# Patient Record
Sex: MALE | Race: ASIAN | NOT HISPANIC OR LATINO | Employment: FULL TIME | URBAN - METROPOLITAN AREA
[De-identification: names, ages, dates, MRNs, and addresses within clinical notes are randomized per-mention and may not be internally consistent; named-entity substitution may affect disease eponyms.]

---

## 2024-06-27 ENCOUNTER — HOSPITAL ENCOUNTER (OUTPATIENT)
Dept: LAB | Facility: MEDICAL CENTER | Age: 49
End: 2024-06-27
Payer: COMMERCIAL

## 2024-06-27 ENCOUNTER — APPOINTMENT (OUTPATIENT)
Dept: MEDICAL GROUP | Facility: PHYSICIAN GROUP | Age: 49
End: 2024-06-27

## 2024-06-27 VITALS
OXYGEN SATURATION: 99 % | DIASTOLIC BLOOD PRESSURE: 80 MMHG | SYSTOLIC BLOOD PRESSURE: 134 MMHG | TEMPERATURE: 97.1 F | HEART RATE: 80 BPM | WEIGHT: 178.25 LBS | BODY MASS INDEX: 27.01 KG/M2 | HEIGHT: 68 IN | RESPIRATION RATE: 20 BRPM

## 2024-06-27 DIAGNOSIS — Z13.0 SCREENING FOR ENDOCRINE, METABOLIC AND IMMUNITY DISORDER: ICD-10-CM

## 2024-06-27 DIAGNOSIS — Z11.59 NEED FOR HEPATITIS C SCREENING TEST: ICD-10-CM

## 2024-06-27 DIAGNOSIS — E55.9 VITAMIN D DEFICIENCY: ICD-10-CM

## 2024-06-27 DIAGNOSIS — Z13.228 SCREENING FOR ENDOCRINE, METABOLIC AND IMMUNITY DISORDER: ICD-10-CM

## 2024-06-27 DIAGNOSIS — Z11.3 SCREENING EXAMINATION FOR SEXUALLY TRANSMITTED DISEASE: ICD-10-CM

## 2024-06-27 DIAGNOSIS — Z13.6 SCREENING FOR CARDIOVASCULAR CONDITION: ICD-10-CM

## 2024-06-27 DIAGNOSIS — Z12.11 COLON CANCER SCREENING: ICD-10-CM

## 2024-06-27 DIAGNOSIS — Z13.29 SCREENING FOR ENDOCRINE, METABOLIC AND IMMUNITY DISORDER: ICD-10-CM

## 2024-06-27 DIAGNOSIS — R73.03 PREDIABETES: ICD-10-CM

## 2024-06-27 LAB
25(OH)D3 SERPL-MCNC: 19 NG/ML (ref 30–100)
ALBUMIN SERPL BCP-MCNC: 4.5 G/DL (ref 3.2–4.9)
ALBUMIN/GLOB SERPL: 1.6 G/DL
ALP SERPL-CCNC: 78 U/L (ref 30–99)
ALT SERPL-CCNC: 44 U/L (ref 2–50)
ANION GAP SERPL CALC-SCNC: 12 MMOL/L (ref 7–16)
AST SERPL-CCNC: 21 U/L (ref 12–45)
BASOPHILS # BLD AUTO: 0.8 % (ref 0–1.8)
BASOPHILS # BLD: 0.05 K/UL (ref 0–0.12)
BILIRUB SERPL-MCNC: 0.9 MG/DL (ref 0.1–1.5)
BUN SERPL-MCNC: 15 MG/DL (ref 8–22)
CALCIUM ALBUM COR SERPL-MCNC: 9.1 MG/DL (ref 8.5–10.5)
CALCIUM SERPL-MCNC: 9.5 MG/DL (ref 8.5–10.5)
CHLORIDE SERPL-SCNC: 100 MMOL/L (ref 96–112)
CHOLEST SERPL-MCNC: 245 MG/DL (ref 100–199)
CO2 SERPL-SCNC: 25 MMOL/L (ref 20–33)
CREAT SERPL-MCNC: 0.86 MG/DL (ref 0.5–1.4)
EOSINOPHIL # BLD AUTO: 0.35 K/UL (ref 0–0.51)
EOSINOPHIL NFR BLD: 5.7 % (ref 0–6.9)
ERYTHROCYTE [DISTWIDTH] IN BLOOD BY AUTOMATED COUNT: 36.1 FL (ref 35.9–50)
EST. AVERAGE GLUCOSE BLD GHB EST-MCNC: 212 MG/DL
FASTING STATUS PATIENT QL REPORTED: NORMAL
GFR SERPLBLD CREATININE-BSD FMLA CKD-EPI: 107 ML/MIN/1.73 M 2
GLOBULIN SER CALC-MCNC: 2.8 G/DL (ref 1.9–3.5)
GLUCOSE SERPL-MCNC: 278 MG/DL (ref 65–99)
HBA1C MFR BLD: 9 % (ref 4–5.6)
HCT VFR BLD AUTO: 47.3 % (ref 42–52)
HCV AB SER QL: NORMAL
HDLC SERPL-MCNC: 52 MG/DL
HGB BLD-MCNC: 17.3 G/DL (ref 14–18)
HIV 1+2 AB+HIV1 P24 AG SERPL QL IA: NORMAL
IMM GRANULOCYTES # BLD AUTO: 0.04 K/UL (ref 0–0.11)
IMM GRANULOCYTES NFR BLD AUTO: 0.7 % (ref 0–0.9)
LDLC SERPL CALC-MCNC: 158 MG/DL
LYMPHOCYTES # BLD AUTO: 1.71 K/UL (ref 1–4.8)
LYMPHOCYTES NFR BLD: 28 % (ref 22–41)
MCH RBC QN AUTO: 29.7 PG (ref 27–33)
MCHC RBC AUTO-ENTMCNC: 36.6 G/DL (ref 32.3–36.5)
MCV RBC AUTO: 81.1 FL (ref 81.4–97.8)
MONOCYTES # BLD AUTO: 0.52 K/UL (ref 0–0.85)
MONOCYTES NFR BLD AUTO: 8.5 % (ref 0–13.4)
NEUTROPHILS # BLD AUTO: 3.44 K/UL (ref 1.82–7.42)
NEUTROPHILS NFR BLD: 56.3 % (ref 44–72)
NRBC # BLD AUTO: 0 K/UL
NRBC BLD-RTO: 0 /100 WBC (ref 0–0.2)
PLATELET # BLD AUTO: 196 K/UL (ref 164–446)
PMV BLD AUTO: 8.8 FL (ref 9–12.9)
POTASSIUM SERPL-SCNC: 3.7 MMOL/L (ref 3.6–5.5)
PROT SERPL-MCNC: 7.3 G/DL (ref 6–8.2)
RBC # BLD AUTO: 5.83 M/UL (ref 4.7–6.1)
SODIUM SERPL-SCNC: 137 MMOL/L (ref 135–145)
TRIGL SERPL-MCNC: 177 MG/DL (ref 0–149)
TSH SERPL DL<=0.005 MIU/L-ACNC: 2.13 UIU/ML (ref 0.38–5.33)
WBC # BLD AUTO: 6.1 K/UL (ref 4.8–10.8)

## 2024-06-27 PROCEDURE — 99204 OFFICE O/P NEW MOD 45 MIN: CPT

## 2024-06-27 PROCEDURE — 80053 COMPREHEN METABOLIC PANEL: CPT

## 2024-06-27 PROCEDURE — 84443 ASSAY THYROID STIM HORMONE: CPT

## 2024-06-27 PROCEDURE — 3075F SYST BP GE 130 - 139MM HG: CPT

## 2024-06-27 PROCEDURE — 36415 COLL VENOUS BLD VENIPUNCTURE: CPT

## 2024-06-27 PROCEDURE — 86803 HEPATITIS C AB TEST: CPT

## 2024-06-27 PROCEDURE — 87389 HIV-1 AG W/HIV-1&-2 AB AG IA: CPT

## 2024-06-27 PROCEDURE — 3079F DIAST BP 80-89 MM HG: CPT

## 2024-06-27 PROCEDURE — 83036 HEMOGLOBIN GLYCOSYLATED A1C: CPT

## 2024-06-27 PROCEDURE — 82306 VITAMIN D 25 HYDROXY: CPT

## 2024-06-27 PROCEDURE — 85025 COMPLETE CBC W/AUTO DIFF WBC: CPT

## 2024-06-27 PROCEDURE — 80061 LIPID PANEL: CPT

## 2024-06-27 SDOH — HEALTH STABILITY: MENTAL HEALTH
STRESS IS WHEN SOMEONE FEELS TENSE, NERVOUS, ANXIOUS, OR CAN'T SLEEP AT NIGHT BECAUSE THEIR MIND IS TROUBLED. HOW STRESSED ARE YOU?: ONLY A LITTLE

## 2024-06-27 SDOH — HEALTH STABILITY: PHYSICAL HEALTH: ON AVERAGE, HOW MANY DAYS PER WEEK DO YOU ENGAGE IN MODERATE TO STRENUOUS EXERCISE (LIKE A BRISK WALK)?: 4 DAYS

## 2024-06-27 SDOH — ECONOMIC STABILITY: INCOME INSECURITY: HOW HARD IS IT FOR YOU TO PAY FOR THE VERY BASICS LIKE FOOD, HOUSING, MEDICAL CARE, AND HEATING?: SOMEWHAT HARD

## 2024-06-27 SDOH — ECONOMIC STABILITY: TRANSPORTATION INSECURITY
IN THE PAST 12 MONTHS, HAS THE LACK OF TRANSPORTATION KEPT YOU FROM MEDICAL APPOINTMENTS OR FROM GETTING MEDICATIONS?: NO

## 2024-06-27 SDOH — ECONOMIC STABILITY: HOUSING INSECURITY
IN THE LAST 12 MONTHS, WAS THERE A TIME WHEN YOU DID NOT HAVE A STEADY PLACE TO SLEEP OR SLEPT IN A SHELTER (INCLUDING NOW)?: NO

## 2024-06-27 SDOH — ECONOMIC STABILITY: INCOME INSECURITY: IN THE LAST 12 MONTHS, WAS THERE A TIME WHEN YOU WERE NOT ABLE TO PAY THE MORTGAGE OR RENT ON TIME?: NO

## 2024-06-27 SDOH — ECONOMIC STABILITY: FOOD INSECURITY: WITHIN THE PAST 12 MONTHS, YOU WORRIED THAT YOUR FOOD WOULD RUN OUT BEFORE YOU GOT MONEY TO BUY MORE.: SOMETIMES TRUE

## 2024-06-27 SDOH — ECONOMIC STABILITY: FOOD INSECURITY: WITHIN THE PAST 12 MONTHS, THE FOOD YOU BOUGHT JUST DIDN'T LAST AND YOU DIDN'T HAVE MONEY TO GET MORE.: NEVER TRUE

## 2024-06-27 SDOH — HEALTH STABILITY: PHYSICAL HEALTH: ON AVERAGE, HOW MANY MINUTES DO YOU ENGAGE IN EXERCISE AT THIS LEVEL?: 60 MIN

## 2024-06-27 SDOH — ECONOMIC STABILITY: TRANSPORTATION INSECURITY
IN THE PAST 12 MONTHS, HAS LACK OF RELIABLE TRANSPORTATION KEPT YOU FROM MEDICAL APPOINTMENTS, MEETINGS, WORK OR FROM GETTING THINGS NEEDED FOR DAILY LIVING?: NO

## 2024-06-27 SDOH — ECONOMIC STABILITY: HOUSING INSECURITY: IN THE LAST 12 MONTHS, HOW MANY PLACES HAVE YOU LIVED?: 2

## 2024-06-27 SDOH — ECONOMIC STABILITY: TRANSPORTATION INSECURITY
IN THE PAST 12 MONTHS, HAS LACK OF TRANSPORTATION KEPT YOU FROM MEETINGS, WORK, OR FROM GETTING THINGS NEEDED FOR DAILY LIVING?: NO

## 2024-06-27 ASSESSMENT — PATIENT HEALTH QUESTIONNAIRE - PHQ9: CLINICAL INTERPRETATION OF PHQ2 SCORE: 0

## 2024-06-27 ASSESSMENT — ENCOUNTER SYMPTOMS
WHEEZING: 0
NAUSEA: 0
PALPITATIONS: 0
CONSTIPATION: 0
HEADACHES: 0
DIZZINESS: 0
CHILLS: 0
ABDOMINAL PAIN: 0
WEIGHT LOSS: 0
SHORTNESS OF BREATH: 0
FEVER: 0
TINGLING: 0
BLOOD IN STOOL: 0
COUGH: 0
DIARRHEA: 0
VOMITING: 0

## 2024-06-27 ASSESSMENT — SOCIAL DETERMINANTS OF HEALTH (SDOH)
DO YOU BELONG TO ANY CLUBS OR ORGANIZATIONS SUCH AS CHURCH GROUPS UNIONS, FRATERNAL OR ATHLETIC GROUPS, OR SCHOOL GROUPS?: YES
HOW OFTEN DO YOU HAVE A DRINK CONTAINING ALCOHOL: MONTHLY OR LESS
HOW OFTEN DO YOU GET TOGETHER WITH FRIENDS OR RELATIVES?: ONCE A WEEK
IN A TYPICAL WEEK, HOW MANY TIMES DO YOU TALK ON THE PHONE WITH FAMILY, FRIENDS, OR NEIGHBORS?: TWICE A WEEK
DO YOU BELONG TO ANY CLUBS OR ORGANIZATIONS SUCH AS CHURCH GROUPS UNIONS, FRATERNAL OR ATHLETIC GROUPS, OR SCHOOL GROUPS?: YES
WITHIN THE PAST 12 MONTHS, YOU WORRIED THAT YOUR FOOD WOULD RUN OUT BEFORE YOU GOT THE MONEY TO BUY MORE: SOMETIMES TRUE
HOW OFTEN DO YOU HAVE SIX OR MORE DRINKS ON ONE OCCASION: LESS THAN MONTHLY
HOW OFTEN DO YOU ATTEND CHURCH OR RELIGIOUS SERVICES?: MORE THAN 4 TIMES PER YEAR
HOW OFTEN DO YOU ATTENT MEETINGS OF THE CLUB OR ORGANIZATION YOU BELONG TO?: MORE THAN 4 TIMES PER YEAR
HOW MANY DRINKS CONTAINING ALCOHOL DO YOU HAVE ON A TYPICAL DAY WHEN YOU ARE DRINKING: 1 OR 2
IN A TYPICAL WEEK, HOW MANY TIMES DO YOU TALK ON THE PHONE WITH FAMILY, FRIENDS, OR NEIGHBORS?: TWICE A WEEK
HOW HARD IS IT FOR YOU TO PAY FOR THE VERY BASICS LIKE FOOD, HOUSING, MEDICAL CARE, AND HEATING?: SOMEWHAT HARD
HOW OFTEN DO YOU GET TOGETHER WITH FRIENDS OR RELATIVES?: ONCE A WEEK
IN THE PAST 12 MONTHS, HAS THE ELECTRIC, GAS, OIL, OR WATER COMPANY THREATENED TO SHUT OFF SERVICE IN YOUR HOME?: NO
HOW OFTEN DO YOU ATTENT MEETINGS OF THE CLUB OR ORGANIZATION YOU BELONG TO?: MORE THAN 4 TIMES PER YEAR
HOW OFTEN DO YOU ATTEND CHURCH OR RELIGIOUS SERVICES?: MORE THAN 4 TIMES PER YEAR

## 2024-06-27 ASSESSMENT — LIFESTYLE VARIABLES
HOW MANY STANDARD DRINKS CONTAINING ALCOHOL DO YOU HAVE ON A TYPICAL DAY: 1 OR 2
HOW OFTEN DO YOU HAVE SIX OR MORE DRINKS ON ONE OCCASION: LESS THAN MONTHLY
AUDIT-C TOTAL SCORE: 2
HOW OFTEN DO YOU HAVE A DRINK CONTAINING ALCOHOL: MONTHLY OR LESS
SKIP TO QUESTIONS 9-10: 0

## 2024-06-27 NOTE — PROGRESS NOTES
"Subjective:     Chief Complaint   Patient presents with    Establish Care     Aurelio Virk is a 49 y.o. male, who is here today to establish care and discuss overall health and possible prediabetes. His prior PCP was in [place].    Problem   Bmi 27.0-27.9,Adult    Patient does not participate in any routine/exercise program and or diet.  Patient states that he does walk his dog daily for about 30 minutes to an hour each day.  Has been avoiding rice but does eat quite a bit of bread  Currently working night shift at [employer]     Prediabetes    Patient feels like they are diagnosed with diabetes and/or prediabetic.  Unable to recall exactly who his previous primary care provider was in [place].  It has been quite sometime since he had blood work done.     Allergies: Patient has no known allergies.    No current outpatient medications on file.     Review of Systems   Constitutional:  Negative for chills, fever and weight loss.   Respiratory:  Negative for cough, shortness of breath and wheezing.    Cardiovascular:  Negative for chest pain, palpitations and leg swelling.   Gastrointestinal:  Negative for abdominal pain, blood in stool, constipation, diarrhea, nausea and vomiting.   Genitourinary:  Negative for hematuria.   Skin:  Negative for itching and rash.   Neurological:  Negative for dizziness, tingling and headaches.     Health Maintenance: Completed    Objective:     /80 (BP Location: Left arm, Patient Position: Sitting, BP Cuff Size: Adult)   Pulse 80   Temp 36.2 °C (97.1 °F) (Temporal)   Resp 20   Ht 1.727 m (5' 8\")   Wt 80.9 kg (178 lb 4 oz)   SpO2 99%  Body mass index is 27.1 kg/m².    Physical Exam  Vitals reviewed.   Constitutional:       General: He is not in acute distress.     Appearance: Normal appearance. He is not ill-appearing.   Cardiovascular:      Rate and Rhythm: Normal rate and regular rhythm.      Heart sounds: Normal heart sounds.   Pulmonary:      Effort: Pulmonary effort is normal. "      Breath sounds: Normal breath sounds.   Abdominal:      General: Abdomen is flat.      Palpations: Abdomen is soft.   Musculoskeletal:         General: Normal range of motion.      Cervical back: Normal range of motion.   Skin:     General: Skin is warm and dry.   Neurological:      General: No focal deficit present.      Mental Status: He is alert.   Psychiatric:         Mood and Affect: Mood normal.         Behavior: Behavior normal.         Thought Content: Thought content normal.         Judgment: Judgment normal.        Assessment & Plan:     The following plan was discussed through shared decision making with the patient:    1. Prediabetes  Chronic, controlled  Will follow-up with blood work discussed decreasing carbohydrates and added sugars in his diet.  Patient will follow-up in a little over a month to discuss blood work and any other concerns  - HEMOGLOBIN A1C; Future    2. BMI 27.0-27.9,adult  Recommendations for healthy lifestyle include:  - Good rule of thumb for portions = size of the palm of your hand  - Eliminate or decrease the amount of all white carbohydrates such as white rice, white bread, white pasta, potatoes, etc.    - Focus on whole foods that are less processed foods and more of plant/animal proteins; great examples are lean poultry or fish, vegetables, nuts, beans, and berries.   - Aim for at least 15-20 grams of fiber per day.    - Avoid drinking sugary beverages such as juice, soda, specialty coffee; these have very little nutritional value and are high in calories.   - Exercise: 150 minutes of moderate aerobic activity per week OR 75 minutes of vigorous aerobic activity per week + 2 days per week of strength    3. Screening for endocrine, metabolic and immunity disorder  Patient establishing care today in office; due for updated lab work/screenings.  Will follow-up in [facility] with lab results.  Discussed with patient that if there is a significant amount of lab abnormalities we  will have a follow-up appointment and I will indicate this is in my follow-up message with lab results.  Patient stated verbal understanding.  - CBC WITH DIFFERENTIAL; Future  - Comp Metabolic Panel; Future  - TSH WITH REFLEX TO FT4; Future    4. Screening for cardiovascular condition  See #3  - Lipid Profile; Future    5. Vitamin D deficiency  See #3  - VITAMIN D,25 HYDROXY (DEFICIENCY); Future    6. Colon cancer screening  See #3  - COLOGUARD (FIT DNA)    7. Screening examination for sexually transmitted disease  See #3  - HIV AG/AB COMBO ASSAY SCREENING; Future    8. Need for hepatitis C screening test  See #3  - HEP C VIRUS ANTIBODY; Future      Return in about 4 weeks (around 7/25/2024) for Labs, Med Check.         Please note that this dictation was created using voice recognition software. I have made every reasonable attempt to correct obvious errors, but I expect that there are errors of grammar and possibly content that I did not discover before finalizing the note.    AMINA Starks  [facility]  [facility]

## 2024-08-01 ENCOUNTER — OFFICE VISIT (OUTPATIENT)
Dept: MEDICAL GROUP | Facility: PHYSICIAN GROUP | Age: 49
End: 2024-08-01
Payer: COMMERCIAL

## 2024-08-01 VITALS
DIASTOLIC BLOOD PRESSURE: 78 MMHG | OXYGEN SATURATION: 99 % | BODY MASS INDEX: 27.03 KG/M2 | HEART RATE: 82 BPM | SYSTOLIC BLOOD PRESSURE: 124 MMHG | TEMPERATURE: 97.8 F | HEIGHT: 68 IN | WEIGHT: 178.38 LBS | RESPIRATION RATE: 20 BRPM

## 2024-08-01 DIAGNOSIS — E11.9 TYPE 2 DIABETES MELLITUS WITHOUT COMPLICATION, WITHOUT LONG-TERM CURRENT USE OF INSULIN (HCC): ICD-10-CM

## 2024-08-01 DIAGNOSIS — R79.89 ABNORMAL CBC: ICD-10-CM

## 2024-08-01 DIAGNOSIS — E78.5 DYSLIPIDEMIA: ICD-10-CM

## 2024-08-01 DIAGNOSIS — E55.9 VITAMIN D DEFICIENCY: ICD-10-CM

## 2024-08-01 PROCEDURE — 3078F DIAST BP <80 MM HG: CPT

## 2024-08-01 PROCEDURE — 3074F SYST BP LT 130 MM HG: CPT

## 2024-08-01 PROCEDURE — 99214 OFFICE O/P EST MOD 30 MIN: CPT

## 2024-08-01 RX ORDER — ERGOCALCIFEROL 1.25 MG/1
50000 CAPSULE ORAL
Qty: 12 CAPSULE | Refills: 3 | Status: SHIPPED | OUTPATIENT
Start: 2024-08-01

## 2024-08-01 ASSESSMENT — FIBROSIS 4 INDEX: FIB4 SCORE: 0.76

## 2024-08-01 NOTE — PROGRESS NOTES
"Subjective:     Chief Complaint   Patient presents with    Lab Results     HPI: Aurelio presents today with  Problem   Vitamin D Deficiency      Currently not taking any vitamin D supplementation.    Patient does work night shift.     Dyslipidemia    Currently not taking any cholesterol-lowering medication.  Patient works night shift currently.  Patient states that he does not feel like his diet is very unhealthy but could probably decrease his amount of rice intake and increase physical activity.  Patient does walk his dog daily around the [place] here in [facility]  Lab Results   Component Value Date/Time    CHOLSTRLTOT 245 (H) 06/27/2024 1127    TRIGLYCERIDE 177 (H) 06/27/2024 1127    HDL 52 06/27/2024 1127     (H) 06/27/2024 1127   The 10-year ASCVD risk score (Vahe ROJAS, et al., 2019) is: 6%     Type 2 Diabetes Mellitus Without Complication, Without Long-Term Current Use of Insulin (Hcc)    Diagnosed in office today.  Patient knew that he was either prediabetic or on the verge of being diabetic.  A1C: 9.0% (6/2024)    Patient does not feel like he eats a lot of sugary sweets but does eat brown rice and carbohydrates.  Patient does not partake in any extracurricular and/or routine physical activity except for walking his dog around the [facility] daily.  Denies any polydipsia, polyuria, polyphasia     Allergies: Patient has no known allergies.  ROS:per HPI  Health Maintenance: Completed   Objective:     /78 (BP Location: Left arm, Patient Position: Sitting, BP Cuff Size: Adult)   Pulse 82   Temp 36.6 °C (97.8 °F) (Temporal)   Resp 20   Ht 1.727 m (5' 8\")   Wt 80.9 kg (178 lb 6 oz)   SpO2 99%   BMI 27.12 kg/m²  Body mass index is 27.12 kg/m².     Physical Exam  Vitals reviewed.   Constitutional:       General: He is not in acute distress.     Appearance: Normal appearance. He is not ill-appearing.   Cardiovascular:      Rate and Rhythm: Normal rate and regular rhythm.   Pulmonary:      " Effort: Pulmonary effort is normal. No respiratory distress.   Skin:     Coloration: Skin is not jaundiced or pale.   Neurological:      General: No focal deficit present.      Mental Status: He is alert and oriented to person, place, and time.   Psychiatric:         Mood and Affect: Mood normal.         Behavior: Behavior normal.         Thought Content: Thought content normal.         Judgment: Judgment normal.        Results for orders placed or performed during the hospital encounter of 06/27/24   HIV AG/AB COMBO ASSAY SCREENING   Result Value Ref Range    HIV Ag/Ab Combo Assay Non-Reactive Non Reactive   HEP C VIRUS ANTIBODY   Result Value Ref Range    Hepatitis C Antibody Non-Reactive Non-Reactive   TSH WITH REFLEX TO FT4   Result Value Ref Range    TSH 2.130 0.380 - 5.330 uIU/mL   VITAMIN D,25 HYDROXY (DEFICIENCY)   Result Value Ref Range    25-Hydroxy   Vitamin D 25 19 (L) 30 - 100 ng/mL   Lipid Profile   Result Value Ref Range    Cholesterol,Tot 245 (H) 100 - 199 mg/dL    Triglycerides 177 (H) 0 - 149 mg/dL    HDL 52 >=40 mg/dL     (H) <100 mg/dL   HEMOGLOBIN A1C   Result Value Ref Range    Glycohemoglobin 9.0 (H) 4.0 - 5.6 %    Est Avg Glucose 212 mg/dL   Comp Metabolic Panel   Result Value Ref Range    Sodium 137 135 - 145 mmol/L    Potassium 3.7 3.6 - 5.5 mmol/L    Chloride 100 96 - 112 mmol/L    Co2 25 20 - 33 mmol/L    Anion Gap 12.0 7.0 - 16.0    Glucose 278 (H) 65 - 99 mg/dL    Bun 15 8 - 22 mg/dL    Creatinine 0.86 0.50 - 1.40 mg/dL    Calcium 9.5 8.5 - 10.5 mg/dL    Correct Calcium 9.1 8.5 - 10.5 mg/dL    AST(SGOT) 21 12 - 45 U/L    ALT(SGPT) 44 2 - 50 U/L    Alkaline Phosphatase 78 30 - 99 U/L    Total Bilirubin 0.9 0.1 - 1.5 mg/dL    Albumin 4.5 3.2 - 4.9 g/dL    Total Protein 7.3 6.0 - 8.2 g/dL    Globulin 2.8 1.9 - 3.5 g/dL    A-G Ratio 1.6 g/dL   CBC WITH DIFFERENTIAL   Result Value Ref Range    WBC 6.1 4.8 - 10.8 K/uL    RBC 5.83 4.70 - 6.10 M/uL    Hemoglobin 17.3 14.0 - 18.0 g/dL     Hematocrit 47.3 42.0 - 52.0 %    MCV 81.1 (L) 81.4 - 97.8 fL    MCH 29.7 27.0 - 33.0 pg    MCHC 36.6 (H) 32.3 - 36.5 g/dL    RDW 36.1 35.9 - 50.0 fL    Platelet Count 196 164 - 446 K/uL    MPV 8.8 (L) 9.0 - 12.9 fL    Neutrophils-Polys 56.30 44.00 - 72.00 %    Lymphocytes 28.00 22.00 - 41.00 %    Monocytes 8.50 0.00 - 13.40 %    Eosinophils 5.70 0.00 - 6.90 %    Basophils 0.80 0.00 - 1.80 %    Immature Granulocytes 0.70 0.00 - 0.90 %    Nucleated RBC 0.00 0.00 - 0.20 /100 WBC    Neutrophils (Absolute) 3.44 1.82 - 7.42 K/uL    Lymphs (Absolute) 1.71 1.00 - 4.80 K/uL    Monos (Absolute) 0.52 0.00 - 0.85 K/uL    Eos (Absolute) 0.35 0.00 - 0.51 K/uL    Baso (Absolute) 0.05 0.00 - 0.12 K/uL    Immature Granulocytes (abs) 0.04 0.00 - 0.11 K/uL    NRBC (Absolute) 0.00 K/uL   FASTING STATUS   Result Value Ref Range    Fasting Status Fasting    ESTIMATED GFR   Result Value Ref Range    GFR (CKD-EPI) 107 >60 mL/min/1.73 m 2      Assessment and Plan:     The following treatment plan was discussed through shared decision making with the patient:    1. Type 2 diabetes mellitus without complication, without long-term current use of insulin (HCC)  Chronic, uncontrolled.  Reviewed patient's dietary intake with focus on reduction or elimination of simple carbohydrates, encouraged patient to increase fiber intake to at least 25 grams per day as tolerated.   Aerobic and anaerobic exercise is recommended, aim for 10 minutes daily five days per week and increase to 30 minutes daily as tolerated.   Patient will start metformin 500 mg daily for the next 2 weeks and as long as he does not develop any symptoms with this medication he will increase to twice daily for the remainder of the 3 months until we follow-up in office.  Current medication will be adjusted with repeat labs in 3 months to re-evaluate glycohemoglobin.   Patient advised to present to ER for severe fatigue, confusion, weakness, rapid heard rate.   - metFORMIN (GLUCOPHAGE)  500 MG Tab; Take 1 Tablet by mouth every day for 14 days, THEN 1 Tablet 2 times a day with meals for 76 days.  Dispense: 166 Tablet; Refill: 0  - Comp Metabolic Panel; Future    2. Dyslipidemia  Chronic, uncontrolled.   The patient is not on a statin for primary prevention. The last cholesterol panel in 6/2024 was outside normal limits.  At this time his ASCVD risk score is still under the recommendation to start statin therapy.  At this time patient will work on diet lifestyle modifications and we will reevaluate this in 6 months.  - Lipid Profile; Future    3. Vitamin D deficiency  Chronic, uncontrolled.  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  Patient will follow-up with updated blood work in 6 months.  - ergocalciferol (DRISDOL) 86089 UNIT capsule; Take 1 Capsule by mouth every 7 days.  Dispense: 12 Capsule; Refill: 3  - VITAMIN D,25 HYDROXY (DEFICIENCY); Future    4. Abnormal CBC  Slight discrepancies in his blood count, we will follow-up with repeat blood work in 6 months  - CBC WITH DIFFERENTIAL; Future      Return in about 3 months (around 11/1/2024) for DM w/ A1C.         Please note that this note was created using dictation with voice recognition software. I have made every reasonable attempt to correct obvious errors, but I expect that there are errors of grammar and possibly content that I did not discover before finalizing the note.    AMINA Starks  [facility]  [facility]

## 2024-09-30 DIAGNOSIS — E11.9 TYPE 2 DIABETES MELLITUS WITHOUT COMPLICATION, WITHOUT LONG-TERM CURRENT USE OF INSULIN (HCC): ICD-10-CM

## 2024-10-14 ENCOUNTER — TELEPHONE (OUTPATIENT)
Dept: HEALTH INFORMATION MANAGEMENT | Facility: OTHER | Age: 49
End: 2024-10-14
Payer: COMMERCIAL

## 2024-11-07 ENCOUNTER — HOSPITAL ENCOUNTER (OUTPATIENT)
Facility: MEDICAL CENTER | Age: 49
End: 2024-11-07
Payer: COMMERCIAL

## 2024-11-07 ENCOUNTER — OFFICE VISIT (OUTPATIENT)
Dept: MEDICAL GROUP | Facility: PHYSICIAN GROUP | Age: 49
End: 2024-11-07
Payer: COMMERCIAL

## 2024-11-07 VITALS
RESPIRATION RATE: 20 BRPM | TEMPERATURE: 97.8 F | OXYGEN SATURATION: 98 % | SYSTOLIC BLOOD PRESSURE: 138 MMHG | HEART RATE: 80 BPM | DIASTOLIC BLOOD PRESSURE: 72 MMHG | BODY MASS INDEX: 27.43 KG/M2 | HEIGHT: 68 IN | WEIGHT: 181 LBS

## 2024-11-07 DIAGNOSIS — E55.9 VITAMIN D DEFICIENCY: ICD-10-CM

## 2024-11-07 DIAGNOSIS — E11.9 TYPE 2 DIABETES MELLITUS WITHOUT COMPLICATION, WITHOUT LONG-TERM CURRENT USE OF INSULIN (HCC): ICD-10-CM

## 2024-11-07 DIAGNOSIS — E78.5 DYSLIPIDEMIA: ICD-10-CM

## 2024-11-07 LAB
CREAT UR-MCNC: 144.21 MG/DL
HBA1C MFR BLD: 9.9 % (ref ?–5.8)
MICROALBUMIN UR-MCNC: 2.9 MG/DL
MICROALBUMIN/CREAT UR: 20 MG/G (ref 0–30)
POCT INT CON NEG: NEGATIVE
POCT INT CON POS: POSITIVE

## 2024-11-07 PROCEDURE — 83036 HEMOGLOBIN GLYCOSYLATED A1C: CPT

## 2024-11-07 PROCEDURE — 82043 UR ALBUMIN QUANTITATIVE: CPT

## 2024-11-07 PROCEDURE — 3078F DIAST BP <80 MM HG: CPT

## 2024-11-07 PROCEDURE — 3075F SYST BP GE 130 - 139MM HG: CPT

## 2024-11-07 PROCEDURE — 99214 OFFICE O/P EST MOD 30 MIN: CPT

## 2024-11-07 PROCEDURE — 82570 ASSAY OF URINE CREATININE: CPT

## 2024-11-07 ASSESSMENT — FIBROSIS 4 INDEX: FIB4 SCORE: 0.76

## 2024-11-07 NOTE — PROGRESS NOTES
"Subjective:     Chief Complaint   Patient presents with    Diabetes Follow-up     History of Present Illness  The patient is a 49-year-old male here to follow up on diabetes with A1c in office and yearly diabetic screenings.    He continues his regimen of metformin 500 mg twice daily. He reports no increase in urinary frequency, attributing any need to urinate to his consumption of caffeinated or energy drinks, particularly in his hot work environment. He experiences no gastrointestinal issues when taking metformin with food, but occasionally experiences discomfort when taken without food. His morning routine includes a small breakfast, typically a piece of bread, to accompany his medication. He admits to a high rice intake, consuming it twice daily. He experienced weakness during the initial weeks of metformin treatment, but this has since resolved.    He has run out of his vitamin D supplement.    He previously had hypertension, which he managed with over-the-counter magnesium for a year. He monitors his blood pressure with a [employer] watch, noting improvement since starting magnesium. He experienced diarrhea when he first started taking magnesium, but this resolved after two weeks. He was previously prescribed blood pressure medication in [place], but found it ineffective and discontinued its use.    FAMILY HISTORY  His father had hypertension.    Allergies: Patient has no known allergies.  ROS per HPI  Health Maintenance: Completed diabetic Screenings today   Objective:     /72 (BP Location: Left arm, Patient Position: Sitting, BP Cuff Size: Adult)   Pulse 80   Temp 36.6 °C (97.8 °F) (Temporal)   Resp 20   Ht 1.727 m (5' 8\")   Wt 82.1 kg (181 lb)   SpO2 98%   BMI 27.52 kg/m²  Body mass index is 27.52 kg/m².     Physical Exam  Vitals reviewed.   Constitutional:       General: He is not in acute distress.     Appearance: Normal appearance. He is not ill-appearing.   Cardiovascular:      Rate and Rhythm: " Normal rate and regular rhythm.   Pulmonary:      Effort: Pulmonary effort is normal. No respiratory distress.   Feet:      Comments: Diabetic foot exam: No lesions or calluses noted. 2+ pedal pulses. Sensation intact with 10 out of 10 on monofilament test.  Skin:     Coloration: Skin is not jaundiced or pale.   Neurological:      General: No focal deficit present.      Mental Status: He is alert and oriented to person, place, and time.   Psychiatric:         Mood and Affect: Mood normal.         Behavior: Behavior normal.         Thought Content: Thought content normal.         Judgment: Judgment normal.        Results  Laboratory Studies  A1c has increased.    Results for orders placed or performed in visit on 11/07/24   POCT  A1C    Collection Time: 11/07/24 11:02 AM   Result Value Ref Range    Glycohemoglobin 9.9 (A) <=5.8 %    Internal Control Positive Positive     Internal Control Negative Negative       Assessment and Plan:     The following treatment plan was discussed through shared decision making with the patient:    1. Type 2 diabetes mellitus without complication, without long-term current use of insulin ([facility])  POCT  A1C    MICROALBUMIN CREAT RATIO URINE    Diabetic Monofilament LE Exam    metformin (GLUCOPHAGE) 1000 MG tablet    HEMOGLOBIN A1C    DISCONTINUED: metformin (GLUCOPHAGE) 1000 MG tablet      2. Vitamin D deficiency        3. Dyslipidemia          Assessment & Plan  1. Diabetes Mellitus.  His A1c levels have increased, indicating a need for dietary modifications and medication adjustment. He was advised to incorporate protein-rich foods and to pair carbohydrates with fats or proteins to maintain steady blood sugar levels. He was also advised to limit his rice intake to once daily and to substitute one meal's rice with a salad or vegetables. His metformin dosage will be increased from 500 mg to 1000 mg twice daily, with a new prescription for a 3-month supply. He was instructed to complete his  blood work, including an A1c test, before his next visit.  Will request his retinal scan from his optometrist that he completed approximate 4 months ago.    2. Vitamin D Deficiency.  He was instructed to continue his vitamin D supplementation and to get a refill from [facility] as he has a supply for a whole year.    3. Hyperlipidemia.  His cholesterol levels are elevated and will be rechecked in the new year. If they remain high despite dietary changes, medication may be necessary due to his increased risk from diabetes.    Return in about 3 months (around 2/7/2025) for Labs, Med Check, Diabetes.       Please note that this note was created using dictation with voice recognition software. I have made every reasonable attempt to correct obvious errors, but I expect that there are errors of grammar and possibly content that I did not discover before finalizing the note.    AMINA Starks  [facility]  [facility]

## 2025-03-21 DIAGNOSIS — E11.9 TYPE 2 DIABETES MELLITUS WITHOUT COMPLICATION, WITHOUT LONG-TERM CURRENT USE OF INSULIN (HCC): ICD-10-CM

## 2025-03-26 ENCOUNTER — TELEPHONE (OUTPATIENT)
Dept: HEALTH INFORMATION MANAGEMENT | Facility: OTHER | Age: 50
End: 2025-03-26
Payer: COMMERCIAL

## 2025-04-14 ENCOUNTER — APPOINTMENT (OUTPATIENT)
Dept: MEDICAL GROUP | Facility: PHYSICIAN GROUP | Age: 50
End: 2025-04-14
Payer: COMMERCIAL

## 2025-04-14 ENCOUNTER — HOSPITAL ENCOUNTER (OUTPATIENT)
Dept: LAB | Facility: MEDICAL CENTER | Age: 50
End: 2025-04-14
Payer: COMMERCIAL

## 2025-04-14 VITALS
OXYGEN SATURATION: 96 % | SYSTOLIC BLOOD PRESSURE: 134 MMHG | DIASTOLIC BLOOD PRESSURE: 80 MMHG | WEIGHT: 181 LBS | HEIGHT: 68 IN | RESPIRATION RATE: 18 BRPM | HEART RATE: 86 BPM | BODY MASS INDEX: 27.43 KG/M2 | TEMPERATURE: 97.2 F

## 2025-04-14 DIAGNOSIS — F32.0 CURRENT MILD EPISODE OF MAJOR DEPRESSIVE DISORDER WITHOUT PRIOR EPISODE (HCC): ICD-10-CM

## 2025-04-14 DIAGNOSIS — E11.9 TYPE 2 DIABETES MELLITUS WITHOUT COMPLICATION, WITHOUT LONG-TERM CURRENT USE OF INSULIN (HCC): ICD-10-CM

## 2025-04-14 DIAGNOSIS — E55.9 VITAMIN D DEFICIENCY: ICD-10-CM

## 2025-04-14 DIAGNOSIS — R79.89 ABNORMAL CBC: ICD-10-CM

## 2025-04-14 DIAGNOSIS — F41.0 PANIC ATTACK: ICD-10-CM

## 2025-04-14 DIAGNOSIS — F41.9 ANXIETY: ICD-10-CM

## 2025-04-14 DIAGNOSIS — E78.5 DYSLIPIDEMIA: ICD-10-CM

## 2025-04-14 PROBLEM — F40.240 CLAUSTROPHOBIA: Status: ACTIVE | Noted: 2025-04-14

## 2025-04-14 PROBLEM — F32.9 MAJOR DEPRESSIVE DISORDER: Status: ACTIVE | Noted: 2025-04-14

## 2025-04-14 LAB
BASOPHILS # BLD AUTO: 0.7 % (ref 0–1.8)
BASOPHILS # BLD: 0.05 K/UL (ref 0–0.12)
EOSINOPHIL # BLD AUTO: 0.38 K/UL (ref 0–0.51)
EOSINOPHIL NFR BLD: 5.1 % (ref 0–6.9)
ERYTHROCYTE [DISTWIDTH] IN BLOOD BY AUTOMATED COUNT: 34.9 FL (ref 35.9–50)
EST. AVERAGE GLUCOSE BLD GHB EST-MCNC: 194 MG/DL
HBA1C MFR BLD: 8.4 % (ref 4–5.6)
HCT VFR BLD AUTO: 47.5 % (ref 42–52)
HGB BLD-MCNC: 17.5 G/DL (ref 14–18)
IMM GRANULOCYTES # BLD AUTO: 0.04 K/UL (ref 0–0.11)
IMM GRANULOCYTES NFR BLD AUTO: 0.5 % (ref 0–0.9)
LYMPHOCYTES # BLD AUTO: 1.24 K/UL (ref 1–4.8)
LYMPHOCYTES NFR BLD: 16.7 % (ref 22–41)
MCH RBC QN AUTO: 29.6 PG (ref 27–33)
MCHC RBC AUTO-ENTMCNC: 36.8 G/DL (ref 32.3–36.5)
MCV RBC AUTO: 80.2 FL (ref 81.4–97.8)
MONOCYTES # BLD AUTO: 0.48 K/UL (ref 0–0.85)
MONOCYTES NFR BLD AUTO: 6.5 % (ref 0–13.4)
NEUTROPHILS # BLD AUTO: 5.22 K/UL (ref 1.82–7.42)
NEUTROPHILS NFR BLD: 70.5 % (ref 44–72)
NRBC # BLD AUTO: 0 K/UL
NRBC BLD-RTO: 0 /100 WBC (ref 0–0.2)
PLATELET # BLD AUTO: 211 K/UL (ref 164–446)
PMV BLD AUTO: 8.7 FL (ref 9–12.9)
RBC # BLD AUTO: 5.92 M/UL (ref 4.7–6.1)
WBC # BLD AUTO: 7.4 K/UL (ref 4.8–10.8)

## 2025-04-14 PROCEDURE — 85025 COMPLETE CBC W/AUTO DIFF WBC: CPT

## 2025-04-14 PROCEDURE — 3075F SYST BP GE 130 - 139MM HG: CPT

## 2025-04-14 PROCEDURE — 36415 COLL VENOUS BLD VENIPUNCTURE: CPT

## 2025-04-14 PROCEDURE — 82306 VITAMIN D 25 HYDROXY: CPT

## 2025-04-14 PROCEDURE — 99214 OFFICE O/P EST MOD 30 MIN: CPT

## 2025-04-14 PROCEDURE — 80053 COMPREHEN METABOLIC PANEL: CPT

## 2025-04-14 PROCEDURE — 80061 LIPID PANEL: CPT

## 2025-04-14 PROCEDURE — 3079F DIAST BP 80-89 MM HG: CPT

## 2025-04-14 PROCEDURE — 83036 HEMOGLOBIN GLYCOSYLATED A1C: CPT

## 2025-04-14 ASSESSMENT — PATIENT HEALTH QUESTIONNAIRE - PHQ9
SUM OF ALL RESPONSES TO PHQ QUESTIONS 1-9: 12
CLINICAL INTERPRETATION OF PHQ2 SCORE: 5
5. POOR APPETITE OR OVEREATING: 1 - SEVERAL DAYS

## 2025-04-14 ASSESSMENT — ANXIETY QUESTIONNAIRES
IF YOU CHECKED OFF ANY PROBLEMS ON THIS QUESTIONNAIRE, HOW DIFFICULT HAVE THESE PROBLEMS MADE IT FOR YOU TO DO YOUR WORK, TAKE CARE OF THINGS AT HOME, OR GET ALONG WITH OTHER PEOPLE: SOMEWHAT DIFFICULT
3. WORRYING TOO MUCH ABOUT DIFFERENT THINGS: NOT AT ALL
5. BEING SO RESTLESS THAT IT IS HARD TO SIT STILL: SEVERAL DAYS
1. FEELING NERVOUS, ANXIOUS, OR ON EDGE: SEVERAL DAYS
6. BECOMING EASILY ANNOYED OR IRRITABLE: NOT AT ALL
4. TROUBLE RELAXING: MORE THAN HALF THE DAYS
7. FEELING AFRAID AS IF SOMETHING AWFUL MIGHT HAPPEN: MORE THAN HALF THE DAYS
GAD7 TOTAL SCORE: 7
2. NOT BEING ABLE TO STOP OR CONTROL WORRYING: SEVERAL DAYS

## 2025-04-14 ASSESSMENT — FIBROSIS 4 INDEX: FIB4 SCORE: 0.76

## 2025-04-14 NOTE — PROGRESS NOTES
Subjective:     Chief Complaint   Patient presents with    Anxiety    Medication Follow-up     Metformin      History of Present Illness  The patient presents for evaluation of diabetes and anxiety.    He is scheduled to undergo laboratory tests today. He reports experiencing gastrointestinal discomfort, including loose stools, which he attributes to his metformin medication. This has led to a reluctance to take the medication on workdays due to frequent bathroom visits. He has been inconsistent with his twice-daily dosage, often only taking it after work or when at home. He has attempted to manage his symptoms by reducing his intake of sugary foods and drinks, even eliminating sugar from his coffee. He recalls that he was able to tolerate the lower dose of metformin 500 mg twice daily without significant issues, except for some stomach problems during the first week of treatment. However, he finds the higher dose challenging to manage. He has been inconsistent with his twice-daily dosage, often only taking it after work or when at home. He recalls that he was able to tolerate the lower dose of metformin 500 mg twice daily without significant issues, except for some stomach problems during the first week of treatment.    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 He recalls an incident where he experienced a sudden onset of claustrophobia while in a COVID-19 patient's room, a sensation he describes as feeling suffocated. This episode lasted approximately 5 to 10 minutes and has since recurred whenever he finds himself in enclosed spaces without a visible exit. He also reports experiencing panic attacks triggered by various stimuli, such as seeing a glass sliding door or a dark room, reminiscent of his time in the ICU. 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 He believes his symptoms are a result of a combination of factors, including the pandemic, his divorce, and past trauma. He reports that his symptoms have improved since these stressors have been resolved, but they occasionally resurface. He experiences triggers both at work and outside of work, and sometimes feels as though he is reliving past stressors or issues. He has been able to manage these episodes through breathing exercises and self-reassurance. He recently experienced an episode at work while under a machine, which he was able to manage by reminding himself that he was safe. He is seeking documentation for his anxiety and depression for future reference.    MEDICATIONS  Current: Metformin    Allergies: Patient has no known allergies.  ROS per HPI  Health Maintenance: Completed   Objective:     /80 (BP Location: Left arm, Patient Position: Sitting, BP Cuff Size: Adult)   Pulse 86   Temp 36.2 °C (97.2 °F) (Temporal)   Resp 18   Ht 1.727 m (5' 8\")   Wt 82.1 kg (181 lb)   SpO2 96%   BMI 27.52 kg/m²  Body mass index is 27.52 kg/m².     Physical Exam  Vitals reviewed.   Constitutional:       General: He is not in acute distress.     Appearance: Normal appearance. He is not ill-appearing.   Cardiovascular:      Rate and Rhythm: Normal rate and regular rhythm.   Pulmonary:      Effort: Pulmonary effort is normal. No respiratory distress.   Skin:     Coloration: Skin is not jaundiced or pale.   Neurological:      General: No focal deficit present.      Mental Status: He is alert and oriented to person, place, and time.   Psychiatric:         Mood and Affect: Mood normal.         Behavior: Behavior normal.         Thought Content: Thought content normal.         Judgment: Judgment normal.        Results  Laboratory Studies  A1c was 9.9 at last visit.    Results for orders placed or performed " during the hospital encounter of 11/07/24   MICROALBUMIN CREAT RATIO URINE    Collection Time: 11/07/24 11:08 AM   Result Value Ref Range    Creatinine, Urine 144.21 mg/dL    Microalbumin, Urine Random 2.9 mg/dL    Micro Alb Creat Ratio 20 0 - 30 mg/g      Assessment and Plan:     The following treatment plan was discussed through shared decision making with the patient:    1. Type 2 diabetes mellitus without complication, without long-term current use of insulin ([facility])        2. Current mild episode of major depressive disorder without prior episode ([facility])  Patient has been identified as having a positive depression screening. Appropriate orders and counseling have been given.      3. Anxiety        4. Panic attack          Assessment & Plan  1. Diabetes Mellitus.  His A1c level was previously recorded at 9.9, indicating a need for improved glycemic control. He has been experiencing gastrointestinal side effects from metformin, which have led to inconsistent adherence to the prescribed regimen. He is advised to maintain a consistent intake of metformin 1000 mg twice daily. Laboratory tests will be conducted today to assess his current A1c level. If the A1c level remains elevated, consideration will be given to adding another medication to his treatment plan.    2. Anxiety and Depression.  He reports experiencing anxiety and depression, particularly in situations involving enclosed spaces or reminders of past traumatic events. He has previously used medications like Xanax or Ativan but discontinued them due to side effects. He is advised to consider therapy as a potential treatment option. If symptoms persist or worsen, medications such as propranolol may be considered for situational anxiety.    Return in about 6 weeks (around 5/26/2025) for Labs, Med Check.       Please note that this note was created using dictation with voice recognition software. I have made every reasonable attempt to correct obvious errors, but  I expect that there are errors of grammar and possibly content that I did not discover before finalizing the note.    AMINA Starks  [facility]  [facility]

## 2025-04-15 LAB
25(OH)D3 SERPL-MCNC: 19 NG/ML (ref 30–100)
ALBUMIN SERPL BCP-MCNC: 4.7 G/DL (ref 3.2–4.9)
ALBUMIN/GLOB SERPL: 1.5 G/DL
ALP SERPL-CCNC: 75 U/L (ref 30–99)
ALT SERPL-CCNC: 46 U/L (ref 2–50)
ANION GAP SERPL CALC-SCNC: 13 MMOL/L (ref 7–16)
AST SERPL-CCNC: 27 U/L (ref 12–45)
BILIRUB SERPL-MCNC: 0.8 MG/DL (ref 0.1–1.5)
BUN SERPL-MCNC: 13 MG/DL (ref 8–22)
CALCIUM ALBUM COR SERPL-MCNC: 9.2 MG/DL (ref 8.5–10.5)
CALCIUM SERPL-MCNC: 9.8 MG/DL (ref 8.5–10.5)
CHLORIDE SERPL-SCNC: 101 MMOL/L (ref 96–112)
CHOLEST SERPL-MCNC: 224 MG/DL (ref 100–199)
CO2 SERPL-SCNC: 25 MMOL/L (ref 20–33)
CREAT SERPL-MCNC: 0.98 MG/DL (ref 0.5–1.4)
GFR SERPLBLD CREATININE-BSD FMLA CKD-EPI: 95 ML/MIN/1.73 M 2
GLOBULIN SER CALC-MCNC: 3.1 G/DL (ref 1.9–3.5)
GLUCOSE SERPL-MCNC: 175 MG/DL (ref 65–99)
HDLC SERPL-MCNC: 50 MG/DL
LDLC SERPL CALC-MCNC: 140 MG/DL
POTASSIUM SERPL-SCNC: 4.1 MMOL/L (ref 3.6–5.5)
PROT SERPL-MCNC: 7.8 G/DL (ref 6–8.2)
SODIUM SERPL-SCNC: 139 MMOL/L (ref 135–145)
TRIGL SERPL-MCNC: 169 MG/DL (ref 0–149)

## 2025-04-21 ENCOUNTER — RESULTS FOLLOW-UP (OUTPATIENT)
Dept: MEDICAL GROUP | Facility: PHYSICIAN GROUP | Age: 50
End: 2025-04-21